# Patient Record
Sex: MALE | Race: OTHER | Employment: FULL TIME | URBAN - METROPOLITAN AREA
[De-identification: names, ages, dates, MRNs, and addresses within clinical notes are randomized per-mention and may not be internally consistent; named-entity substitution may affect disease eponyms.]

---

## 2017-01-27 PROCEDURE — 84153 ASSAY OF PSA TOTAL: CPT | Performed by: INTERNAL MEDICINE

## 2017-04-12 ENCOUNTER — HOSPITAL ENCOUNTER (OUTPATIENT)
Dept: RESPIRATORY THERAPY | Facility: HOSPITAL | Age: 47
Discharge: HOME | End: 2017-04-12
Attending: INTERNAL MEDICINE
Payer: COMMERCIAL

## 2017-04-12 PROCEDURE — 36600 WITHDRAWAL OF ARTERIAL BLOOD: CPT | Performed by: INTERNAL MEDICINE

## 2017-04-12 PROCEDURE — 82805 BLOOD GASES W/O2 SATURATION: CPT | Performed by: INTERNAL MEDICINE

## 2017-04-12 PROCEDURE — 80048 BASIC METABOLIC PNL TOTAL CA: CPT | Performed by: INTERNAL MEDICINE

## 2017-04-12 PROCEDURE — 85025 COMPLETE CBC W/AUTO DIFF WBC: CPT | Performed by: INTERNAL MEDICINE

## 2017-04-12 PROCEDURE — 82550 ASSAY OF CK (CPK): CPT | Performed by: INTERNAL MEDICINE

## 2017-05-19 PROCEDURE — 85378 FIBRIN DEGRADE SEMIQUANT: CPT | Performed by: INTERNAL MEDICINE

## 2017-05-19 PROCEDURE — 36415 COLL VENOUS BLD VENIPUNCTURE: CPT | Performed by: INTERNAL MEDICINE

## 2017-07-24 ENCOUNTER — HOSPITAL ENCOUNTER (INPATIENT)
Facility: HOSPITAL | Age: 47
LOS: 2 days | Discharge: HOME | DRG: 176 | End: 2017-07-26
Attending: HOSPITALIST | Admitting: HOSPITALIST
Payer: COMMERCIAL

## 2017-07-24 PROBLEM — I26.99 BILATERAL PULMONARY EMBOLISM (HCC): Status: ACTIVE | Noted: 2017-07-24

## 2017-07-24 RX ORDER — ALBUTEROL SULFATE 90 UG/1
2 AEROSOL, METERED RESPIRATORY (INHALATION) EVERY 6 HOURS PRN
Status: DISCONTINUED | OUTPATIENT
Start: 2017-07-24 | End: 2017-07-26

## 2017-07-24 RX ORDER — ACETAMINOPHEN 325 MG/1
650 TABLET ORAL EVERY 6 HOURS PRN
Status: DISCONTINUED | OUTPATIENT
Start: 2017-07-24 | End: 2017-07-26

## 2017-07-24 RX ORDER — ENOXAPARIN SODIUM 100 MG/ML
100 INJECTION SUBCUTANEOUS EVERY 12 HOURS SCHEDULED
Status: DISCONTINUED | OUTPATIENT
Start: 2017-07-25 | End: 2017-07-26

## 2017-07-24 RX ORDER — POLYETHYLENE GLYCOL 3350 17 G/17G
17 POWDER, FOR SOLUTION ORAL DAILY PRN
Status: DISCONTINUED | OUTPATIENT
Start: 2017-07-24 | End: 2017-07-26

## 2017-07-24 RX ORDER — BISACODYL 10 MG
10 SUPPOSITORY, RECTAL RECTAL
Status: DISCONTINUED | OUTPATIENT
Start: 2017-07-24 | End: 2017-07-26

## 2017-07-24 RX ORDER — DOCUSATE SODIUM 100 MG/1
100 CAPSULE, LIQUID FILLED ORAL 2 TIMES DAILY
Status: DISCONTINUED | OUTPATIENT
Start: 2017-07-24 | End: 2017-07-26

## 2017-07-24 RX ORDER — SODIUM PHOSPHATE, DIBASIC AND SODIUM PHOSPHATE, MONOBASIC 7; 19 G/133ML; G/133ML
1 ENEMA RECTAL ONCE AS NEEDED
Status: DISCONTINUED | OUTPATIENT
Start: 2017-07-24 | End: 2017-07-26

## 2017-07-24 RX ORDER — SODIUM CHLORIDE 0.9 % (FLUSH) 0.9 %
3 SYRINGE (ML) INJECTION AS NEEDED
Status: DISCONTINUED | OUTPATIENT
Start: 2017-07-24 | End: 2017-07-26

## 2017-07-24 RX ORDER — ONDANSETRON 2 MG/ML
4 INJECTION INTRAMUSCULAR; INTRAVENOUS EVERY 6 HOURS PRN
Status: DISCONTINUED | OUTPATIENT
Start: 2017-07-24 | End: 2017-07-26

## 2017-07-25 ENCOUNTER — APPOINTMENT (OUTPATIENT)
Dept: ULTRASOUND IMAGING | Facility: HOSPITAL | Age: 47
DRG: 176 | End: 2017-07-25
Attending: HOSPITALIST
Payer: COMMERCIAL

## 2017-07-25 ENCOUNTER — APPOINTMENT (OUTPATIENT)
Dept: CV DIAGNOSTICS | Facility: HOSPITAL | Age: 47
DRG: 176 | End: 2017-07-25
Attending: NURSE PRACTITIONER
Payer: COMMERCIAL

## 2017-07-25 PROBLEM — I82.432 ACUTE DEEP VEIN THROMBOSIS (DVT) OF POPLITEAL VEIN OF LEFT LOWER EXTREMITY (HCC): Status: ACTIVE | Noted: 2017-07-25

## 2017-07-25 PROBLEM — R91.1 LUNG NODULE: Status: ACTIVE | Noted: 2017-07-25

## 2017-07-25 PROCEDURE — 93306 TTE W/DOPPLER COMPLETE: CPT | Performed by: NURSE PRACTITIONER

## 2017-07-25 PROCEDURE — 93970 EXTREMITY STUDY: CPT | Performed by: HOSPITALIST

## 2017-07-25 RX ORDER — ZOLPIDEM TARTRATE 5 MG/1
5 TABLET ORAL ONCE
Status: COMPLETED | OUTPATIENT
Start: 2017-07-25 | End: 2017-07-25

## 2017-07-25 NOTE — CONSULTS
Pulmonary H&P/Consult     NAME: Lam Fitzgerald: [id number] - MRN: [id number] - Age: 56year old - :  1970    Date of Admission: 2017 10:14 PM  Admission Diagnosis: bilateral pulmonary embolism  Bilateral pulmonary embolism (HCC)    Lauren reviewed. No pertinent surgical history.   Family History   Problem Relation Age of Onset   • Heart Disorder Father      MI   • Glaucoma Father    • Cancer Mother      breast   • Stroke Sister    • Lung Disorder Neg       Smoking status: Former Smoker  1.50 independently visualized all relevant chest imaging in PACS, agree with radiology interpretation except where noted.

## 2017-07-25 NOTE — H&P
[facility] Hospitalist Team  History and Physical     ASSESSMENT / PLAN:   57 yo male with previous tobacco use, ? Asthma/RAD, occular HTN and  Intermittent SOB who was found to have B/L PE and lung nodule done on outpt testing. Pt admitted for evaluation and tx. not get this done as he wanted to [facility] it\" despite being informed of risks. 2 weeks ago he felt like his breathing was back to normal however again found just recently that he could not do his usual exercises without feeling SOB.  He denies any CP or Pa for the 7/24/17 encounter [facility] Encounter).     Soc Hx     Smoking status: Former Smoker  1.50 Packs/day  For 20.00 Years     Start date: 4/12/1986    Quit date: 2/28/2009    Smokeless tobacco: Never Used    Comment: 1-2 packs    Alcohol use Yes  0.0 oz ([id number])    Result Date: 7/25/2017  CONCLUSION:  1. Acute appearing nonocclusive thrombus in the left popliteal vein.  2. No evidence of occlusive or nonocclusive thrombosis in the visualized portions of the deep venous systems in the right lower extremi

## 2017-07-26 VITALS
BODY MASS INDEX: 33 KG/M2 | TEMPERATURE: 98 F | WEIGHT: 228.56 LBS | SYSTOLIC BLOOD PRESSURE: 124 MMHG | HEART RATE: 57 BPM | DIASTOLIC BLOOD PRESSURE: 79 MMHG | RESPIRATION RATE: 18 BRPM | OXYGEN SATURATION: 97 %

## 2017-07-26 LAB
ANION GAP SERPL CALC-SCNC: 8 MMOL/L (ref 0–18)
BASOPHILS # BLD: 0.1 K/UL (ref 0–0.2)
BASOPHILS NFR BLD: 1 %
BUN SERPL-MCNC: 14 MG/DL (ref 8–20)
BUN/CREAT SERPL: 12.7 (ref 10–20)
CALCIUM SERPL-MCNC: 9.6 MG/DL (ref 8.5–10.5)
CHLORIDE SERPL-SCNC: 106 MMOL/L (ref 95–110)
CO2 SERPL-SCNC: 24 MMOL/L (ref 22–32)
CREAT SERPL-MCNC: 1.1 MG/DL (ref 0.5–1.5)
EOSINOPHIL # BLD: 0.4 K/UL (ref 0–0.7)
EOSINOPHIL NFR BLD: 5 %
ERYTHROCYTE [DISTWIDTH] IN BLOOD BY AUTOMATED COUNT: 14.4 % (ref 11–15)
GLUCOSE SERPL-MCNC: 120 MG/DL (ref 70–99)
HCT VFR BLD AUTO: 49.4 % (ref 41–52)
HGB BLD-MCNC: 16.6 G/DL (ref 13.5–17.5)
LYMPHOCYTES # BLD: 2.9 K/UL (ref 1–4)
LYMPHOCYTES NFR BLD: 37 %
MCH RBC QN AUTO: 28.9 PG (ref 27–32)
MCHC RBC AUTO-ENTMCNC: 33.5 G/DL (ref 32–37)
MCV RBC AUTO: 86.4 FL (ref 80–100)
MONOCYTES # BLD: 0.7 K/UL (ref 0–1)
MONOCYTES NFR BLD: 9 %
NEUTROPHILS # BLD AUTO: 3.8 K/UL (ref 1.8–7.7)
NEUTROPHILS NFR BLD: 48 %
OSMOLALITY UR CALC.SUM OF ELEC: 288 MOSM/KG (ref 275–295)
PLATELET # BLD AUTO: 292 K/UL (ref 140–400)
PMV BLD AUTO: 7.3 FL (ref 7.4–10.3)
POTASSIUM SERPL-SCNC: 4 MMOL/L (ref 3.3–5.1)
RBC # BLD AUTO: 5.72 M/UL (ref 4.5–5.9)
SODIUM SERPL-SCNC: 138 MMOL/L (ref 136–144)
WBC # BLD AUTO: 7.8 K/UL (ref 4–11)

## 2017-07-26 PROCEDURE — 85025 COMPLETE CBC W/AUTO DIFF WBC: CPT | Performed by: NURSE PRACTITIONER

## 2017-07-26 PROCEDURE — 80048 BASIC METABOLIC PNL TOTAL CA: CPT | Performed by: NURSE PRACTITIONER

## 2017-07-26 NOTE — PROGRESS NOTES
Pulmonary Progress Note     Assessment / Plan:  1. DVT/PE, acute - symptoms ongoing since 5/17, progressive. No provocating factors. Trop normal, but Cr elevated.  TTE with mildly dilated RV with PASP 40. LE duplex with nonocclusive left popliteal DVT  - co

## 2017-07-26 NOTE — DISCHARGE SUMMARY
[facility] Hospitalist Discharge Summary   Patient ID:  Karishma Isaacs  [id number]  28 year old  2/11/1970    Admit date: 7/24/2017  Discharge date: 7/26/2017  Risk of Readmission Lace+ Score: 11  59-90 High Risk  29-58 Medium Risk  0-28   Low Risk    Primary Ca details      B/L Pulmonary Embolus/Left Leg Popliteal DVT  -CT chest- B/L PE  -US legs-acute nonocclusive clot left popliteal DVT  -Echo-Ef 55-60% no WMA, nl RV size, PAP 40  -xarelto 15 mg BID x 21 days then 20 mg daily  -oupt hypercoag work up with Ghada Tee the visualized portions of the deep venous systems in the right lower extremity.           Discharge Instructions     Medication List      START taking these medications    Rivaroxaban 15 & 20 MG Tbpk  Commonly known as:  XARELTO STARTER PACK  Take 1 tablet partner in [facility] about possible clotting disorder to cause blood clot    Follow up with eye doctor about resuming eye drops     Total Time Coordinating Care: greater than 30 minutes    Patient had opportunity to ask questions and state understand and agree w

## 2017-07-26 NOTE — PLAN OF CARE
Patient Centered Care    • Patient preferences are identified and integrated in the patient's plan of care Completed        Patient/Family Goals    • Patient/Family Long Term Goal Completed    • Patient/Family Short Term Goal Completed        RESPIRATORY -

## 2017-07-27 NOTE — PAYOR COMM NOTE
REF#[id number]    Deana Torres #[id number]   Admission Info: Inpatient (Adm: 07/24/17)   Hospital Account: [de-identified]   Description: 56year old M Primary Service: Cardiac Telemetry Unit Info: [facility] 3-W/SW   History and Physical     H&P signed by Leslie Lake, 57 yo male with previous tobacco use, ? Asthma/RAD and  Intermittent SOB who was found to have B/L PE and lung nodule done on outpt testing. Pt admitted for evaluation and tx.  Pt found to have left popliteal DVT, started on lovenox and pulmonary consulted ABDOMEN:  Soft, BS+; nondistended; non tender;no masses;    EXTREMITIES: no edema; no cyanosis;, no calf tenderness; peripheral pulses intact      PMH       Past Medical History:   Diagnosis Date   • DVT, popliteal, acute ([facility] [address]     • Family history of glauc IMPRESSION:  1. Bilateral pulmonary emboli. Mild straightening of the intraventricular septum. 2.  Minimal groundglass airspace disease within the right upper lobe and left lower lobe likely reflective of an infectious/inflammatory pneumonitis.  3.  Right Pulm: Lungs clear, normal respiratory effort, No wheezing or crackles  CV: Heart with regular rate and rhythm, No murmurs, rubs, gallops  Abd: Abdomen soft, nontender, nondistended,   MSK:  no clubbing, no cyanosis.   No Lower extremity edema  Skin: no rash - recommend hypercoag evaluation as outpatient with hematology  2. Pulmonary nodule - 4mm, former smoker  - repeat imaging in 12 months  3. Asthma - less likely diagnosis in the setting of VTE  - cont albuterol prn  - d/c breo   4.  Dispo - full code  - leesa   Comment: 1-2 packs                 Alcohol use Yes   0.0 oz/week        Comment: OCCASIONAL      Medications:  • docusate sodium  100 mg Oral BID   • enoxaparin  100 mg Subcutaneous 2 times per day         ALLERGIES:   Sulfa Antibiotics Progress Notes signed by Chelsea Castellanos MD at 7/26/2017  9:55 AM     Author: Chelsea Castellanos MD Service: Pulmonology Author Type: Physician   Filed: 7/26/2017  9:55 AM Date of Service: 7/26/2017  9:51 AM Status: Signed   : Chelsea Castellanos MD ( Discharge Summary Notes     Discharge Summaries signed by La Nena Bautista MD at 7/26/2017  2:02 PM     Author: La Nena Bautista MD Service: Hospitalist Author Type: Physician   Filed: 7/26/2017  2:02 PM Date of Service: 7/26/2017  7:22 AM Status: Addendum   : [facility] Hospital Course:     55 yo male with previous tobacco use, ? Asthma/RAD, occular HTN and  Intermittent SOB who was found to have B/L PE and lung nodule done on outpt testing. Pt admitted for evaluation and tx.  Pt found to have left popliteal DVT, started o   Hui Lamb MD. Schedule an appointment as soon as possible for a visit in 1 week.     Specialties:  Internal Medicine, PEDIATRICS  Why:  hospital follow up  Contact information:  [address]

## 2017-11-27 PROCEDURE — 86147 CARDIOLIPIN ANTIBODY EA IG: CPT | Performed by: INTERNAL MEDICINE

## 2017-11-27 PROCEDURE — 85307 ASSAY ACTIVATED PROTEIN C: CPT | Performed by: INTERNAL MEDICINE

## 2017-11-27 PROCEDURE — 85306 CLOT INHIBIT PROT S FREE: CPT | Performed by: INTERNAL MEDICINE

## 2017-11-27 PROCEDURE — 85732 THROMBOPLASTIN TIME PARTIAL: CPT | Performed by: INTERNAL MEDICINE

## 2017-11-27 PROCEDURE — 81241 F5 GENE: CPT | Performed by: INTERNAL MEDICINE

## 2017-11-27 PROCEDURE — 85705 THROMBOPLASTIN INHIBITION: CPT | Performed by: INTERNAL MEDICINE

## 2017-11-27 PROCEDURE — 85610 PROTHROMBIN TIME: CPT | Performed by: INTERNAL MEDICINE

## 2017-11-27 PROCEDURE — 85305 CLOT INHIBIT PROT S TOTAL: CPT | Performed by: INTERNAL MEDICINE

## 2017-11-27 PROCEDURE — 86146 BETA-2 GLYCOPROTEIN ANTIBODY: CPT | Performed by: INTERNAL MEDICINE

## 2017-11-27 PROCEDURE — 85613 RUSSELL VIPER VENOM DILUTED: CPT | Performed by: INTERNAL MEDICINE

## 2017-11-27 PROCEDURE — 81240 F2 GENE: CPT | Performed by: INTERNAL MEDICINE

## 2017-11-27 PROCEDURE — 85301 ANTITHROMBIN III ANTIGEN: CPT | Performed by: INTERNAL MEDICINE

## 2017-11-27 PROCEDURE — 85303 CLOT INHIBIT PROT C ACTIVITY: CPT | Performed by: INTERNAL MEDICINE

## 2018-12-01 ENCOUNTER — OFFICE VISIT (OUTPATIENT)
Dept: INTERNAL MEDICINE CLINIC | Facility: CLINIC | Age: 48
End: 2018-12-01
Payer: COMMERCIAL

## 2018-12-01 VITALS
DIASTOLIC BLOOD PRESSURE: 88 MMHG | HEIGHT: 70 IN | HEART RATE: 64 BPM | WEIGHT: 226 LBS | SYSTOLIC BLOOD PRESSURE: 138 MMHG | BODY MASS INDEX: 32.35 KG/M2 | TEMPERATURE: 98 F

## 2018-12-01 DIAGNOSIS — Z76.89 ENCOUNTER TO ESTABLISH CARE: Primary | ICD-10-CM

## 2018-12-01 DIAGNOSIS — R73.09 ELEVATED GLUCOSE LEVEL: ICD-10-CM

## 2018-12-01 DIAGNOSIS — G47.00 INSOMNIA, UNSPECIFIED TYPE: ICD-10-CM

## 2018-12-01 PROCEDURE — 90686 IIV4 VACC NO PRSV 0.5 ML IM: CPT | Performed by: INTERNAL MEDICINE

## 2018-12-01 PROCEDURE — 90471 IMMUNIZATION ADMIN: CPT | Performed by: INTERNAL MEDICINE

## 2018-12-01 PROCEDURE — 99204 OFFICE O/P NEW MOD 45 MIN: CPT | Performed by: INTERNAL MEDICINE

## 2018-12-01 RX ORDER — TRAZODONE HYDROCHLORIDE 50 MG/1
25 TABLET ORAL NIGHTLY
Qty: 30 TABLET | Refills: 1 | Status: SHIPPED | OUTPATIENT
Start: 2018-12-01 | End: 2019-02-12

## 2018-12-01 NOTE — PROGRESS NOTES
HPI:    Patient ID: Evans Gutiérrez is a 53year old male.     HPI  Patient comes in for first time to establish care he is complaining today that he is unable to lose weight even though he exercises regularly and he watches his diet pretty well last year h Take 1 tablet (600 mg total) by mouth every 6 (six) hours as needed for Pain.  Disp: 40 tablet Rfl: 1     Allergies:  Sulfa Antibiotics           HISTORY:  Past Medical History:   Diagnosis Date   • DVT, popliteal, acute ([facility] [address]    • Essential hypertension no edema or tenderness. Neurological: He is alert and oriented to person, place, and time. He has normal reflexes. Skin: Skin is warm and dry. Psychiatric: He has a normal mood and affect.  His behavior is normal. Judgment and thought content normal.

## 2018-12-08 ENCOUNTER — APPOINTMENT (OUTPATIENT)
Dept: LAB | Age: 48
End: 2018-12-08
Attending: INTERNAL MEDICINE
Payer: COMMERCIAL

## 2018-12-08 DIAGNOSIS — R73.09 ELEVATED GLUCOSE LEVEL: ICD-10-CM

## 2018-12-08 DIAGNOSIS — Z76.89 ENCOUNTER TO ESTABLISH CARE: ICD-10-CM

## 2018-12-08 DIAGNOSIS — G47.00 INSOMNIA, UNSPECIFIED TYPE: ICD-10-CM

## 2018-12-08 PROCEDURE — 83036 HEMOGLOBIN GLYCOSYLATED A1C: CPT

## 2018-12-08 PROCEDURE — 36415 COLL VENOUS BLD VENIPUNCTURE: CPT

## 2018-12-08 PROCEDURE — 84403 ASSAY OF TOTAL TESTOSTERONE: CPT

## 2018-12-15 ENCOUNTER — PATIENT MESSAGE (OUTPATIENT)
Dept: INTERNAL MEDICINE CLINIC | Facility: CLINIC | Age: 48
End: 2018-12-15

## 2018-12-15 DIAGNOSIS — R53.83 OTHER FATIGUE: Primary | ICD-10-CM

## 2018-12-17 ENCOUNTER — TELEPHONE (OUTPATIENT)
Dept: INTERNAL MEDICINE CLINIC | Facility: CLINIC | Age: 48
End: 2018-12-17

## 2018-12-17 NOTE — TELEPHONE ENCOUNTER
How come the free testosterone was not done just total, usually they are ordered together or am I not seeing the right lab? ?

## 2018-12-17 NOTE — TELEPHONE ENCOUNTER
Left message on voicemail that patient needs to have testosterone level redrawn at lab. Sample needs to be frozen and we didn't have capability to freeze here in office. He has labs drawn at [facility]. Needs his [facility] orders. They are in system.

## 2018-12-17 NOTE — TELEPHONE ENCOUNTER
From: Yariel Villafana  To: Nithya Christina MD  Sent: 12/15/2018 7:54 AM CST  Subject: Test Results Question    Dr. Stephen Solano,    So looking at my testosterone levels I know that it is within normal range but is there anything we can do to raise it?  I am compet

## 2018-12-17 NOTE — TELEPHONE ENCOUNTER
Patient needs to have blood work for testosterone drawn at [facility]. Order placed. Left message for patient to call back.

## 2019-01-26 ENCOUNTER — APPOINTMENT (OUTPATIENT)
Dept: LAB | Age: 49
End: 2019-01-26
Attending: INTERNAL MEDICINE
Payer: COMMERCIAL

## 2019-01-26 PROCEDURE — 36415 COLL VENOUS BLD VENIPUNCTURE: CPT | Performed by: INTERNAL MEDICINE

## 2019-01-26 PROCEDURE — 84402 ASSAY OF FREE TESTOSTERONE: CPT | Performed by: INTERNAL MEDICINE

## 2019-01-26 PROCEDURE — 84403 ASSAY OF TOTAL TESTOSTERONE: CPT | Performed by: INTERNAL MEDICINE

## 2019-02-01 ENCOUNTER — TELEPHONE (OUTPATIENT)
Dept: INTERNAL MEDICINE CLINIC | Facility: CLINIC | Age: 49
End: 2019-02-01

## 2019-02-01 ENCOUNTER — MED REC SCAN ONLY (OUTPATIENT)
Dept: INTERNAL MEDICINE CLINIC | Facility: CLINIC | Age: 49
End: 2019-02-01

## 2019-02-01 DIAGNOSIS — R79.89 LOW TESTOSTERONE: Primary | ICD-10-CM

## 2019-03-11 ENCOUNTER — OFFICE VISIT (OUTPATIENT)
Dept: SURGERY | Facility: CLINIC | Age: 49
End: 2019-03-11
Payer: COMMERCIAL

## 2019-03-11 ENCOUNTER — APPOINTMENT (OUTPATIENT)
Dept: LAB | Facility: HOSPITAL | Age: 49
End: 2019-03-11
Attending: INTERNAL MEDICINE
Payer: COMMERCIAL

## 2019-03-11 VITALS
OXYGEN SATURATION: 100 % | BODY MASS INDEX: 32.79 KG/M2 | RESPIRATION RATE: 18 BRPM | HEART RATE: 101 BPM | HEIGHT: 70 IN | WEIGHT: 229.06 LBS

## 2019-03-11 DIAGNOSIS — I10 ESSENTIAL HYPERTENSION: ICD-10-CM

## 2019-03-11 DIAGNOSIS — I10 ESSENTIAL HYPERTENSION: Primary | ICD-10-CM

## 2019-03-11 DIAGNOSIS — R63.5 WEIGHT GAIN: ICD-10-CM

## 2019-03-11 DIAGNOSIS — R63.2 BINGE EATING: ICD-10-CM

## 2019-03-11 DIAGNOSIS — E66.9 OBESITY (BMI 30-39.9): ICD-10-CM

## 2019-03-11 DIAGNOSIS — R73.03 PRE-DIABETES: ICD-10-CM

## 2019-03-11 DIAGNOSIS — Z51.81 ENCOUNTER FOR THERAPEUTIC DRUG MONITORING: ICD-10-CM

## 2019-03-11 PROCEDURE — 99204 OFFICE O/P NEW MOD 45 MIN: CPT | Performed by: INTERNAL MEDICINE

## 2019-03-11 PROCEDURE — 93010 ELECTROCARDIOGRAM REPORT: CPT | Performed by: INTERNAL MEDICINE

## 2019-03-11 PROCEDURE — 93005 ELECTROCARDIOGRAM TRACING: CPT

## 2019-03-11 NOTE — PROGRESS NOTES
[facility] Consultation Note       Date of Consult:  3/11/2019    Patient:  Len Domínguez  :      1970  MRN:      [id number]    Referring Provider: Dr. Brian Dick       Chief Complaint:  Patient presents with:  Consult: non surgica file    Occupational History      Occupation:     Social Needs      Financial resource strain: Not on file      Food insecurity:        Worry: Not on file        Inability: Not on file      Transportation needs:        Medical: Not on file burger, steak, veggies, sweet potatoes,  Fruit, PB, sweets,  Chicken, steak, veggie, starch   +sweet tooth      Soda Drinker?: No  If yes, how much?:      Number of restaurant or fast food meals/week:   meals/week    Nutritional Goals Reviewed and Discusse Diagnosis(ses):   Essential hypertension  (primary encounter diagnosis)  Pre-diabetes  Encounter for therapeutic drug monitoring  Weight gain  Obesity (BMI 30-39. 9)    PLAN     Patient is not interested in bariatric surgery.  Patient desires to pursue tradi

## 2019-03-14 ENCOUNTER — TELEPHONE (OUTPATIENT)
Dept: SURGERY | Facility: CLINIC | Age: 49
End: 2019-03-14

## 2019-03-14 NOTE — TELEPHONE ENCOUNTER
Per the request of Dr. Chelo Sanders patient and left voicemail letting him know that Body Composition Test is not covered under his insurance. I did give him the cost and gave him dept phone number to call and schedule with Dietitian.

## 2019-03-14 NOTE — TELEPHONE ENCOUNTER
3/13/19 @ 3:11pm Spoke to Gilmer Gavin at [facility], [phone], ALL#[id number]. She verified that patient has following benefits for Bariatric services:   • EHV (OWK#[id number]) DX E66.9 & E66.3   - Dietitian ([id number]) – Yes. No limit,  no prior auth req’d.

## 2019-04-18 ENCOUNTER — OFFICE VISIT (OUTPATIENT)
Dept: SURGERY | Facility: CLINIC | Age: 49
End: 2019-04-18
Payer: COMMERCIAL

## 2019-04-18 VITALS
BODY MASS INDEX: 32.5 KG/M2 | WEIGHT: 227 LBS | SYSTOLIC BLOOD PRESSURE: 138 MMHG | OXYGEN SATURATION: 95 % | HEIGHT: 70 IN | HEART RATE: 59 BPM | DIASTOLIC BLOOD PRESSURE: 90 MMHG | RESPIRATION RATE: 16 BRPM

## 2019-04-18 DIAGNOSIS — G47.00 INSOMNIA, UNSPECIFIED TYPE: Primary | ICD-10-CM

## 2019-04-18 DIAGNOSIS — E66.9 OBESITY (BMI 30-39.9): ICD-10-CM

## 2019-04-18 DIAGNOSIS — Z51.81 ENCOUNTER FOR THERAPEUTIC DRUG MONITORING: ICD-10-CM

## 2019-04-18 DIAGNOSIS — R73.03 PRE-DIABETES: ICD-10-CM

## 2019-04-18 PROCEDURE — 99214 OFFICE O/P EST MOD 30 MIN: CPT | Performed by: INTERNAL MEDICINE

## 2019-04-18 RX ORDER — TRAZODONE HYDROCHLORIDE 50 MG/1
50 TABLET ORAL NIGHTLY
Qty: 30 TABLET | Refills: 2 | Status: SHIPPED | OUTPATIENT
Start: 2019-04-18 | End: 2021-11-23

## 2019-04-18 RX ORDER — IBUPROFEN 600 MG/1
TABLET ORAL
Refills: 0 | COMMUNITY
Start: 2019-04-08 | End: 2021-11-23

## 2019-04-18 NOTE — PROGRESS NOTES
[address] [place] [address] Candice Vincentdomenico  [facility] [address] [facility]  Dept: [phone]       Patient:  Gracy Junior  :      1970  MRN:      [id number]    Chief Complaint:  Patient presents insecurity:        Worry: Not on file        Inability: Not on file      Transportation needs:        Medical: Not on file        Non-medical: Not on file    Tobacco Use      Smoking status: Former Smoker        Packs/day: 1.50        Years: 20.00        P weights at gym  · Shahnaz ellsworth  · Has rigorous work out routine     Eating Habits  · Patient states the following:  · Eats 3 meal(s) per day  · Length of time it takes to consume a meal:  20  · # of snacks per day: 1 Type of snacks:   Yogurt, fruit  · Amount Encounter Diagnosis(ses): Insomnia, unspecified type  (primary encounter diagnosis)  Pre-diabetes  Encounter for therapeutic drug monitoring  Obesity (bmi 30-39. 9)    PLAN     Patient is not interested in bariatric surgery.  Patient desires to pursu

## 2019-05-03 ENCOUNTER — OFFICE VISIT (OUTPATIENT)
Dept: FAMILY MEDICINE CLINIC | Facility: CLINIC | Age: 49
End: 2019-05-03
Payer: COMMERCIAL

## 2019-05-03 ENCOUNTER — APPOINTMENT (OUTPATIENT)
Dept: LAB | Age: 49
End: 2019-05-03
Attending: FAMILY MEDICINE
Payer: COMMERCIAL

## 2019-05-03 VITALS
BODY MASS INDEX: 32.53 KG/M2 | HEART RATE: 62 BPM | SYSTOLIC BLOOD PRESSURE: 148 MMHG | DIASTOLIC BLOOD PRESSURE: 72 MMHG | HEIGHT: 70 IN | WEIGHT: 227.25 LBS

## 2019-05-03 DIAGNOSIS — R73.9 HYPERGLYCEMIA: ICD-10-CM

## 2019-05-03 DIAGNOSIS — R73.9 HYPERGLYCEMIA: Primary | ICD-10-CM

## 2019-05-03 DIAGNOSIS — Z00.00 ROUTINE PHYSICAL EXAMINATION: ICD-10-CM

## 2019-05-03 DIAGNOSIS — D17.1 LIPOMA OF TORSO: ICD-10-CM

## 2019-05-03 PROCEDURE — 36415 COLL VENOUS BLD VENIPUNCTURE: CPT

## 2019-05-03 PROCEDURE — 84460 ALANINE AMINO (ALT) (SGPT): CPT

## 2019-05-03 PROCEDURE — 80061 LIPID PANEL: CPT

## 2019-05-03 PROCEDURE — 84443 ASSAY THYROID STIM HORMONE: CPT

## 2019-05-03 PROCEDURE — 84450 TRANSFERASE (AST) (SGOT): CPT

## 2019-05-03 PROCEDURE — 80048 BASIC METABOLIC PNL TOTAL CA: CPT

## 2019-05-03 PROCEDURE — 99396 PREV VISIT EST AGE 40-64: CPT | Performed by: FAMILY MEDICINE

## 2019-05-03 NOTE — PROGRESS NOTES
Blood pressure 148/72, pulse 62, height 5' 10\" (1.778 m), weight 227 lb 4 oz (103.1 kg). REASON FOR VISIT:    Karishma Isaacs is a 51year old male who presents for an [address].         Patient Active Problem List:     OH (ocular hyperte (H)   03/01/2014 102 (H)        Gonorrhea Screening If high risk No results found for: GONOCOCCUS   HIV Screening For all adults age 22-65, older adults at increased risk No results found for: HIV   Syphilis Screening Screen if pregnant or high risk No res 0.0 oz      Comment: OCCASIONAL    Drug use: Yes      Frequency: 2.0 times per week      Types: Cannabis    Occ:  :       REVIEW OF SYSTEMS:   GENERAL: feels well otherwise  SKIN: denies any unusual skin lesions  EYES: denies blurred vision or doubl Future  -     LIPID PANEL; Future  -     TSH W REFLEX TO FREE T4; Future  -     CT CALCIUM SCORING; Future    Lipoma of torso  -     SURGERY - INTERNAL       The patient indicates understanding of these issues and agrees to the plan.   Return in about 3 wee

## 2019-05-03 NOTE — PATIENT INSTRUCTIONS
Insomnia  Insomnia is repeated difficulty going to sleep or staying asleep, or both. Whether you have insomnia is not defined by a specific amount of sleep.  Different people need different amounts of sleep, and you may need more or less sleep at [place] [address] Many different medidcines can affect your sleep, such as stimulants, caffeine, alcohol, some decongestants, and diet pills.  Other medicines may include some types of blood pressure pills, steroids, asthma medicines, antihistamines, antidepressants, seizure · Get regular exercise. Find other ways to lessen your stress level. · If a medicine was prescribed to help reset your sleep patterns, take it as directed. Sleeping pills are intended for short-term use, only.  If taken for too long, the effect wears off w Good sleeping habits are a key part of treatment. If needed, some medicines may help you sleep better at first. Making healthy lifestyle changes and learning to relax can improve your sleep. Treating insomnia takes commitment.  But trust that your efforts w Stress, anxiety, and body tension may keep you awake at night. To unwind before bedtime, try a warm bath, meditation, or yoga. Also try the following:  · Deep breathing. Sit or lie back in a chair. Take a slow, deep breath. Hold it for 5 counts.  Then breat

## 2020-11-16 ENCOUNTER — HOSPITAL ENCOUNTER (OUTPATIENT)
Age: 50
Discharge: HOME | End: 2020-11-16
Attending: EMERGENCY MEDICINE
Payer: COMMERCIAL

## 2020-11-16 VITALS
HEIGHT: 71 IN | OXYGEN SATURATION: 97 % | SYSTOLIC BLOOD PRESSURE: 136 MMHG | BODY MASS INDEX: 32.2 KG/M2 | HEART RATE: 64 BPM | DIASTOLIC BLOOD PRESSURE: 94 MMHG | TEMPERATURE: 98 F | WEIGHT: 230 LBS | RESPIRATION RATE: 20 BRPM

## 2020-11-16 DIAGNOSIS — Z20.822 COVID-19 VIRUS TEST RESULT UNKNOWN: Primary | ICD-10-CM

## 2020-11-16 PROCEDURE — 99203 OFFICE O/P NEW LOW 30 MIN: CPT

## 2020-11-16 PROCEDURE — 99213 OFFICE O/P EST LOW 20 MIN: CPT

## 2020-11-16 NOTE — ED INITIAL ASSESSMENT (HPI)
PATIENT WITH INTERMITTENT SCRATCHY THROAT. WIFE AND DAUGHTER TESTED FOR COVID OVER THE WEEKEND. WAITING FOR TEST RESULTS.

## 2020-11-16 NOTE — ED PROVIDER NOTES
Patient Seen in: [facility]      History   Patient presents with:  Testing    Stated Complaint: Covid 19 test    HPI    Patient is a 52-year-old male who requests Covid test today.   He states he has had a scratchy throat and maybe a slight co benton          ED Course     Labs Reviewed   SARS-COV-2 RNA,QUAL RT-PCR ([facility])         MDM     COVID-19 counseling performed.  -Discussed the need for immediate isolation, even before results of the test are available.   -Advised patient to inform their i

## 2021-06-21 ENCOUNTER — HOSPITAL ENCOUNTER (OUTPATIENT)
Age: 51
Discharge: HOME | End: 2021-06-21
Payer: COMMERCIAL

## 2021-06-21 VITALS
HEART RATE: 67 BPM | SYSTOLIC BLOOD PRESSURE: 139 MMHG | TEMPERATURE: 98 F | DIASTOLIC BLOOD PRESSURE: 93 MMHG | RESPIRATION RATE: 18 BRPM | OXYGEN SATURATION: 100 %

## 2021-06-21 DIAGNOSIS — B35.9 TINEA: Primary | ICD-10-CM

## 2021-06-21 PROCEDURE — 99213 OFFICE O/P EST LOW 20 MIN: CPT

## 2021-06-21 RX ORDER — LORAZEPAM 0.5 MG/1
0.5 TABLET ORAL NIGHTLY PRN
COMMUNITY
Start: 2021-03-27 | End: 2021-11-23

## 2021-06-21 RX ORDER — LISINOPRIL 20 MG/1
20 TABLET ORAL DAILY
COMMUNITY
Start: 2020-10-21

## 2021-06-21 NOTE — ED PROVIDER NOTES
Patient Seen in: [facility]      History   Patient presents with:  Rash Skin Problem    Stated Complaint: Possible ringworm    HPI/Subjective:   Ochoa Paredes is a 49year old male here for possible ringworm.   There is an outbreak at his gy nursing note reviewed. Constitutional:       Appearance: Normal appearance. He is not ill-appearing. HENT:      Head: Normocephalic. Eyes:      Conjunctiva/sclera: Conjunctivae normal.      Pupils: Pupils are equal, round, and reactive to light.    Pu diagnosis)     Disposition:  Discharge  6/21/2021  6:19 pm    Follow-up:  Lakeisha Smith MD  [address] [place] [address]  [phone]                Medications Prescribed:  Discharge Medication List as of 6/21/2021  6:20 PM    START taking th

## 2022-01-02 PROBLEM — F41.9 ANXIETY: Status: ACTIVE | Noted: 2022-01-02

## 2022-08-04 ENCOUNTER — LAB REQUISITION (OUTPATIENT)
Dept: LAB | Age: 52
End: 2022-08-04

## 2022-08-04 ENCOUNTER — LAB SERVICES (OUTPATIENT)
Dept: LAB | Age: 52
End: 2022-08-04

## 2022-08-04 DIAGNOSIS — R68.82 DECREASED LIBIDO: ICD-10-CM

## 2022-08-04 DIAGNOSIS — R73.9 HYPERGLYCEMIA, UNSPECIFIED: ICD-10-CM

## 2022-08-04 DIAGNOSIS — Z00.00 ENCOUNTER FOR GENERAL ADULT MEDICAL EXAMINATION WITHOUT ABNORMAL FINDINGS: ICD-10-CM

## 2022-08-04 DIAGNOSIS — Z12.5 ENCOUNTER FOR SCREENING FOR MALIGNANT NEOPLASM OF PROSTATE: ICD-10-CM

## 2022-08-04 LAB
ALBUMIN SERPL-MCNC: 4.1 G/DL (ref 3.6–5.1)
ALBUMIN/GLOB SERPL: 1.4 {RATIO} (ref 1–2.4)
ALP SERPL-CCNC: 64 UNITS/L (ref 45–117)
ALT SERPL-CCNC: 21 UNITS/L
ANION GAP SERPL CALC-SCNC: 13 MMOL/L (ref 7–19)
AST SERPL-CCNC: 11 UNITS/L
BASOPHILS # BLD: 0.1 K/MCL (ref 0–0.3)
BASOPHILS NFR BLD: 1 %
BILIRUB SERPL-MCNC: 0.5 MG/DL (ref 0.2–1)
BUN SERPL-MCNC: 23 MG/DL (ref 6–20)
BUN/CREAT SERPL: 20 (ref 7–25)
CALCIUM SERPL-MCNC: 9.9 MG/DL (ref 8.4–10.2)
CHLORIDE SERPL-SCNC: 109 MMOL/L (ref 97–110)
CHOLEST SERPL-MCNC: 189 MG/DL
CHOLEST/HDLC SERPL: 3.5 {RATIO}
CO2 SERPL-SCNC: 24 MMOL/L (ref 21–32)
CREAT SERPL-MCNC: 1.13 MG/DL (ref 0.67–1.17)
DEPRECATED RDW RBC: 41.5 FL (ref 39–50)
EOSINOPHIL # BLD: 0.2 K/MCL (ref 0–0.5)
EOSINOPHIL NFR BLD: 4 %
ERYTHROCYTE [DISTWIDTH] IN BLOOD: 12.6 % (ref 11–15)
FASTING DURATION TIME PATIENT: ABNORMAL H
FASTING DURATION TIME PATIENT: NORMAL H
GFR SERPLBLD BASED ON 1.73 SQ M-ARVRAT: 79 ML/MIN
GLOBULIN SER-MCNC: 2.9 G/DL (ref 2–4)
GLUCOSE SERPL-MCNC: 100 MG/DL (ref 70–99)
HBA1C MFR BLD: 5.6 % (ref 4.5–5.6)
HCT VFR BLD CALC: 43.8 % (ref 39–51)
HDLC SERPL-MCNC: 54 MG/DL
HGB BLD-MCNC: 15 G/DL (ref 13–17)
IMM GRANULOCYTES # BLD AUTO: 0 K/MCL (ref 0–0.2)
IMM GRANULOCYTES # BLD: 0 %
LDLC SERPL CALC-MCNC: 117 MG/DL
LYMPHOCYTES # BLD: 2.2 K/MCL (ref 1–4)
LYMPHOCYTES NFR BLD: 34 %
MCH RBC QN AUTO: 30.6 PG (ref 26–34)
MCHC RBC AUTO-ENTMCNC: 34.2 G/DL (ref 32–36.5)
MCV RBC AUTO: 89.4 FL (ref 78–100)
MONOCYTES # BLD: 0.6 K/MCL (ref 0.3–0.9)
MONOCYTES NFR BLD: 9 %
NEUTROPHILS # BLD: 3.4 K/MCL (ref 1.8–7.7)
NEUTROPHILS NFR BLD: 52 %
NONHDLC SERPL-MCNC: 135 MG/DL
NRBC BLD MANUAL-RTO: 0 /100 WBC
PLATELET # BLD AUTO: 329 K/MCL (ref 140–450)
POTASSIUM SERPL-SCNC: 4.8 MMOL/L (ref 3.4–5.1)
PROT SERPL-MCNC: 7 G/DL (ref 6.4–8.2)
PSA SERPL-MCNC: 3.35 NG/ML
RBC # BLD: 4.9 MIL/MCL (ref 4.5–5.9)
SODIUM SERPL-SCNC: 141 MMOL/L (ref 135–145)
TESTOST SERPL-MCNC: 318.5 NG/DL (ref 280–1100)
TRIGL SERPL-MCNC: 90 MG/DL
TSH SERPL-ACNC: 1.46 MCUNITS/ML (ref 0.35–5)
WBC # BLD: 6.6 K/MCL (ref 4.2–11)

## 2022-08-04 PROCEDURE — 84443 ASSAY THYROID STIM HORMONE: CPT | Performed by: CLINICAL MEDICAL LABORATORY

## 2022-08-04 PROCEDURE — 84153 ASSAY OF PSA TOTAL: CPT | Performed by: CLINICAL MEDICAL LABORATORY

## 2022-08-04 PROCEDURE — 36415 COLL VENOUS BLD VENIPUNCTURE: CPT | Performed by: CLINICAL MEDICAL LABORATORY

## 2022-08-04 PROCEDURE — 85025 COMPLETE CBC W/AUTO DIFF WBC: CPT | Performed by: CLINICAL MEDICAL LABORATORY

## 2022-08-04 PROCEDURE — 84403 ASSAY OF TOTAL TESTOSTERONE: CPT | Performed by: CLINICAL MEDICAL LABORATORY

## 2022-08-04 PROCEDURE — 80053 COMPREHEN METABOLIC PANEL: CPT | Performed by: CLINICAL MEDICAL LABORATORY

## 2022-08-04 PROCEDURE — 83036 HEMOGLOBIN GLYCOSYLATED A1C: CPT | Performed by: CLINICAL MEDICAL LABORATORY

## 2022-08-04 PROCEDURE — 80061 LIPID PANEL: CPT | Performed by: CLINICAL MEDICAL LABORATORY

## 2022-08-06 ENCOUNTER — LAB REQUISITION (OUTPATIENT)
Dept: LAB | Age: 52
End: 2022-08-06

## 2022-08-06 ENCOUNTER — LAB SERVICES (OUTPATIENT)
Dept: LAB | Age: 52
End: 2022-08-06

## 2022-08-06 DIAGNOSIS — R06.02 SHORTNESS OF BREATH: ICD-10-CM

## 2022-08-06 LAB — D DIMER PPP FEU-MCNC: 0.4 MG/L (FEU)

## 2022-08-06 PROCEDURE — 36415 COLL VENOUS BLD VENIPUNCTURE: CPT | Performed by: CLINICAL MEDICAL LABORATORY

## 2022-08-06 PROCEDURE — 85379 FIBRIN DEGRADATION QUANT: CPT | Performed by: CLINICAL MEDICAL LABORATORY

## 2022-09-16 DIAGNOSIS — R06.02 SOB (SHORTNESS OF BREATH): Primary | ICD-10-CM

## 2022-09-21 ENCOUNTER — HOSPITAL ENCOUNTER (OUTPATIENT)
Dept: GENERAL RADIOLOGY | Age: 52
Discharge: HOME | End: 2022-09-21
Attending: FAMILY MEDICINE

## 2022-09-21 ENCOUNTER — HOSPITAL ENCOUNTER (OUTPATIENT)
Dept: CARDIOLOGY | Age: 52
Discharge: HOME | End: 2022-09-21
Attending: FAMILY MEDICINE

## 2022-09-21 DIAGNOSIS — R06.02 SOB (SHORTNESS OF BREATH): ICD-10-CM

## 2022-09-21 DIAGNOSIS — R06.02 SHORTNESS OF BREATH: ICD-10-CM

## 2022-09-21 LAB — STRESS TARGET HR: 169 BPM

## 2022-09-21 PROCEDURE — 93017 CV STRESS TEST TRACING ONLY: CPT

## 2022-09-21 PROCEDURE — 71046 X-RAY EXAM CHEST 2 VIEWS: CPT

## 2022-09-21 PROCEDURE — 93018 CV STRESS TEST I&R ONLY: CPT | Performed by: INTERNAL MEDICINE

## 2022-09-21 PROCEDURE — 93016 CV STRESS TEST SUPVJ ONLY: CPT | Performed by: INTERNAL MEDICINE

## 2022-09-23 DIAGNOSIS — R06.02 SOB (SHORTNESS OF BREATH): Primary | ICD-10-CM

## 2023-03-14 DIAGNOSIS — M54.9 UPPER BACK PAIN ON RIGHT SIDE: ICD-10-CM

## 2023-03-14 DIAGNOSIS — M54.2 NECK PAIN: Primary | ICD-10-CM

## 2023-03-14 ASSESSMENT — ENCOUNTER SYMPTOMS: PAIN FREQUENCY: INTERMITTENT

## 2023-03-15 ENCOUNTER — HOSPITAL ENCOUNTER (OUTPATIENT)
Dept: PHYSICAL MEDICINE AND REHAB | Age: 53
Discharge: STILL ADMITTED OR ADMINISTRATIVE | End: 2023-03-15
Attending: FAMILY MEDICINE

## 2023-03-15 PROCEDURE — 97110 THERAPEUTIC EXERCISES: CPT | Performed by: PHYSICAL THERAPIST

## 2023-03-15 PROCEDURE — 97161 PT EVAL LOW COMPLEX 20 MIN: CPT | Performed by: PHYSICAL THERAPIST

## 2023-03-15 ASSESSMENT — ENCOUNTER SYMPTOMS
ALLEVIATING FACTORS: CHANGE IN POSITION
SUBJECTIVE PAIN PROGRESSION: IMPROVED
PAIN SEVERITY NOW: 1
PAIN SCALE AT HIGHEST: 8
ALLEVIATING FACTORS: MASSAGE
QUALITY: BURNING
ALLEVIATING FACTORS: REST
PAIN SCALE AT LOWEST: 1

## 2023-03-20 ENCOUNTER — HOSPITAL ENCOUNTER (OUTPATIENT)
Dept: PHYSICAL MEDICINE AND REHAB | Age: 53
Discharge: STILL ADMITTED OR ADMINISTRATIVE | End: 2023-03-20
Attending: FAMILY MEDICINE

## 2023-03-20 PROCEDURE — 97112 NEUROMUSCULAR REEDUCATION: CPT | Performed by: PHYSICAL THERAPIST

## 2023-03-20 PROCEDURE — 97110 THERAPEUTIC EXERCISES: CPT | Performed by: PHYSICAL THERAPIST

## 2023-03-20 PROCEDURE — 97140 MANUAL THERAPY 1/> REGIONS: CPT | Performed by: PHYSICAL THERAPIST

## 2023-03-20 ASSESSMENT — ENCOUNTER SYMPTOMS
PAIN SCALE AT LOWEST: 0
PAIN SEVERITY NOW: 1

## 2023-03-22 ENCOUNTER — HOSPITAL ENCOUNTER (OUTPATIENT)
Dept: PHYSICAL MEDICINE AND REHAB | Age: 53
Discharge: STILL ADMITTED OR ADMINISTRATIVE | End: 2023-03-22
Attending: FAMILY MEDICINE

## 2023-03-22 PROCEDURE — 97140 MANUAL THERAPY 1/> REGIONS: CPT | Performed by: PHYSICAL THERAPY ASSISTANT

## 2023-03-22 PROCEDURE — 97112 NEUROMUSCULAR REEDUCATION: CPT | Performed by: PHYSICAL THERAPY ASSISTANT

## 2023-03-22 PROCEDURE — 97110 THERAPEUTIC EXERCISES: CPT | Performed by: PHYSICAL THERAPY ASSISTANT

## 2023-03-22 ASSESSMENT — ENCOUNTER SYMPTOMS: PAIN SEVERITY NOW: 2

## 2023-03-27 ENCOUNTER — HOSPITAL ENCOUNTER (OUTPATIENT)
Dept: PHYSICAL MEDICINE AND REHAB | Age: 53
Discharge: STILL ADMITTED OR ADMINISTRATIVE | End: 2023-03-27
Attending: FAMILY MEDICINE

## 2023-03-27 PROCEDURE — 97110 THERAPEUTIC EXERCISES: CPT | Performed by: PHYSICAL THERAPY ASSISTANT

## 2023-03-27 PROCEDURE — 97140 MANUAL THERAPY 1/> REGIONS: CPT | Performed by: PHYSICAL THERAPY ASSISTANT

## 2023-03-27 ASSESSMENT — ENCOUNTER SYMPTOMS: PAIN SEVERITY NOW: 2

## 2023-03-29 ENCOUNTER — APPOINTMENT (OUTPATIENT)
Dept: PHYSICAL MEDICINE AND REHAB | Age: 53
End: 2023-03-29
Attending: FAMILY MEDICINE

## 2023-11-26 ENCOUNTER — V-VISIT (OUTPATIENT)
Dept: FAMILY MEDICINE | Age: 53
End: 2023-11-26

## 2023-11-26 DIAGNOSIS — J06.9 VIRAL UPPER RESPIRATORY TRACT INFECTION WITH COUGH: Primary | ICD-10-CM

## 2023-11-26 PROCEDURE — 99203 OFFICE O/P NEW LOW 30 MIN: CPT | Performed by: NURSE PRACTITIONER

## 2023-11-26 RX ORDER — ALBUTEROL SULFATE 90 UG/1
1-2 AEROSOL, METERED RESPIRATORY (INHALATION) EVERY 4 HOURS PRN
Qty: 1 EACH | Refills: 0 | Status: SHIPPED | OUTPATIENT
Start: 2023-11-26

## 2023-11-26 RX ORDER — BENZONATATE 200 MG/1
200 CAPSULE ORAL 3 TIMES DAILY PRN
Qty: 21 CAPSULE | Refills: 0 | Status: SHIPPED | OUTPATIENT
Start: 2023-11-26 | End: 2023-12-03

## 2023-11-26 RX ORDER — ASPIRIN 81 MG/1
TABLET, CHEWABLE ORAL DAILY
COMMUNITY

## 2024-01-03 ENCOUNTER — HOSPITAL ENCOUNTER (OUTPATIENT)
Dept: GENERAL RADIOLOGY | Age: 54
Discharge: HOME | End: 2024-01-03

## 2024-01-03 DIAGNOSIS — R05.9 COUGH: ICD-10-CM

## 2024-01-03 DIAGNOSIS — J06.9 VIRAL UPPER RESPIRATORY TRACT INFECTION WITH COUGH: ICD-10-CM

## 2024-01-03 PROCEDURE — 71046 X-RAY EXAM CHEST 2 VIEWS: CPT

## 2024-01-03 RX ORDER — ALBUTEROL SULFATE 90 UG/1
1-2 AEROSOL, METERED RESPIRATORY (INHALATION) EVERY 4 HOURS PRN
Qty: 8.5 G | Refills: 0 | Status: SHIPPED | OUTPATIENT
Start: 2024-01-03

## 2024-01-08 ENCOUNTER — APPOINTMENT (OUTPATIENT)
Dept: FAMILY MEDICINE | Age: 54
End: 2024-01-08

## 2024-04-24 ENCOUNTER — LAB REQUISITION (OUTPATIENT)
Dept: LAB | Age: 54
End: 2024-04-24

## 2024-04-24 DIAGNOSIS — R73.9 HYPERGLYCEMIA, UNSPECIFIED: ICD-10-CM

## 2024-04-24 DIAGNOSIS — Z12.5 ENCOUNTER FOR SCREENING FOR MALIGNANT NEOPLASM OF PROSTATE: ICD-10-CM

## 2024-04-24 DIAGNOSIS — Z00.00 ENCOUNTER FOR GENERAL ADULT MEDICAL EXAMINATION WITHOUT ABNORMAL FINDINGS: ICD-10-CM

## 2024-04-26 ENCOUNTER — LAB SERVICES (OUTPATIENT)
Dept: LAB | Age: 54
End: 2024-04-26

## 2024-04-26 PROCEDURE — 80050 GENERAL HEALTH PANEL: CPT | Performed by: CLINICAL MEDICAL LABORATORY

## 2024-04-26 PROCEDURE — 80061 LIPID PANEL: CPT | Performed by: CLINICAL MEDICAL LABORATORY

## 2024-04-26 PROCEDURE — 84153 ASSAY OF PSA TOTAL: CPT | Performed by: CLINICAL MEDICAL LABORATORY

## 2024-04-26 PROCEDURE — 83036 HEMOGLOBIN GLYCOSYLATED A1C: CPT | Performed by: CLINICAL MEDICAL LABORATORY

## 2024-04-27 LAB
ALBUMIN SERPL-MCNC: 4.3 G/DL (ref 3.6–5.1)
ALBUMIN/GLOB SERPL: 1.4 {RATIO} (ref 1–2.4)
ALP SERPL-CCNC: 71 UNITS/L (ref 45–117)
ALT SERPL-CCNC: 25 UNITS/L
ANION GAP SERPL CALC-SCNC: 11 MMOL/L (ref 7–19)
AST SERPL-CCNC: 15 UNITS/L
BASOPHILS # BLD: 0.1 K/MCL (ref 0–0.3)
BASOPHILS NFR BLD: 1 %
BILIRUB SERPL-MCNC: 0.9 MG/DL (ref 0.2–1)
BUN SERPL-MCNC: 19 MG/DL (ref 6–20)
BUN/CREAT SERPL: 14 (ref 7–25)
CALCIUM SERPL-MCNC: 9.6 MG/DL (ref 8.4–10.2)
CHLORIDE SERPL-SCNC: 106 MMOL/L (ref 97–110)
CHOLEST SERPL-MCNC: 196 MG/DL
CHOLEST/HDLC SERPL: 3.5 {RATIO}
CO2 SERPL-SCNC: 26 MMOL/L (ref 21–32)
CREAT SERPL-MCNC: 1.4 MG/DL (ref 0.67–1.17)
DEPRECATED RDW RBC: 46.9 FL (ref 39–50)
EGFRCR SERPLBLD CKD-EPI 2021: 60 ML/MIN/{1.73_M2}
EOSINOPHIL # BLD: 0.2 K/MCL (ref 0–0.5)
EOSINOPHIL NFR BLD: 2 %
ERYTHROCYTE [DISTWIDTH] IN BLOOD: 14.1 % (ref 11–15)
FASTING DURATION TIME PATIENT: 12 HOURS (ref 0–999)
GLOBULIN SER-MCNC: 3 G/DL (ref 2–4)
GLUCOSE SERPL-MCNC: 98 MG/DL (ref 70–99)
HBA1C MFR BLD: 5.2 % (ref 4.5–5.6)
HCT VFR BLD CALC: 53.9 % (ref 39–51)
HDLC SERPL-MCNC: 56 MG/DL
HGB BLD-MCNC: 17.7 G/DL (ref 13–17)
IMM GRANULOCYTES # BLD AUTO: 0 K/MCL (ref 0–0.2)
IMM GRANULOCYTES # BLD: 0 %
LDLC SERPL CALC-MCNC: 124 MG/DL
LYMPHOCYTES # BLD: 2.5 K/MCL (ref 1–4)
LYMPHOCYTES NFR BLD: 22 %
MCH RBC QN AUTO: 29.9 PG (ref 26–34)
MCHC RBC AUTO-ENTMCNC: 32.8 G/DL (ref 32–36.5)
MCV RBC AUTO: 91.2 FL (ref 78–100)
MONOCYTES # BLD: 1.1 K/MCL (ref 0.3–0.9)
MONOCYTES NFR BLD: 10 %
NEUTROPHILS # BLD: 7.3 K/MCL (ref 1.8–7.7)
NEUTROPHILS NFR BLD: 65 %
NONHDLC SERPL-MCNC: 140 MG/DL
NRBC BLD MANUAL-RTO: 0 /100 WBC
PLATELET # BLD AUTO: 300 K/MCL (ref 140–450)
POTASSIUM SERPL-SCNC: 4.7 MMOL/L (ref 3.4–5.1)
PROT SERPL-MCNC: 7.3 G/DL (ref 6.4–8.2)
PSA SERPL-MCNC: 4.75 NG/ML
RBC # BLD: 5.91 MIL/MCL (ref 4.5–5.9)
SODIUM SERPL-SCNC: 138 MMOL/L (ref 135–145)
TRIGL SERPL-MCNC: 78 MG/DL
TSH SERPL-ACNC: 2.4 MCUNITS/ML (ref 0.35–5)
WBC # BLD: 11.2 K/MCL (ref 4.2–11)

## 2024-10-17 ENCOUNTER — LAB REQUISITION (OUTPATIENT)
Dept: LAB | Age: 54
End: 2024-10-17

## 2024-10-17 DIAGNOSIS — R97.20 ELEVATED PROSTATE SPECIFIC ANTIGEN (PSA): ICD-10-CM

## 2024-10-17 DIAGNOSIS — D75.1 SECONDARY POLYCYTHEMIA: ICD-10-CM

## 2024-10-17 SDOH — ECONOMIC STABILITY: HOUSING INSECURITY: DO YOU HAVE PROBLEMS WITH ANY OF THE FOLLOWING?: NONE OF THE ABOVE;PATIENT DECLINED

## 2024-10-17 SDOH — ECONOMIC STABILITY: HOUSING INSECURITY: WHAT IS YOUR LIVING SITUATION TODAY?: PATIENT DECLINED

## 2024-10-17 SDOH — ECONOMIC STABILITY: FOOD INSECURITY: WITHIN THE PAST 12 MONTHS, THE FOOD YOU BOUGHT JUST DIDN'T LAST AND YOU DIDN'T HAVE MONEY TO GET MORE.: PATIENT DECLINED

## 2024-10-17 SDOH — ECONOMIC STABILITY: GENERAL: WOULD YOU LIKE HELP WITH ANY OF THE FOLLOWING NEEDS?: I DON'T WANT HELP WITH ANY OF THESE

## 2024-10-17 SDOH — ECONOMIC STABILITY: TRANSPORTATION INSECURITY
IN THE PAST 12 MONTHS, HAS LACK OF RELIABLE TRANSPORTATION KEPT YOU FROM MEDICAL APPOINTMENTS, MEETINGS, WORK OR FROM GETTING THINGS NEEDED FOR DAILY LIVING?: PATIENT DECLINED

## 2024-10-17 ASSESSMENT — SOCIAL DETERMINANTS OF HEALTH (SDOH)
IN THE PAST 12 MONTHS, HAS THE ELECTRIC, GAS, OIL, OR WATER COMPANY THREATENED TO SHUT OFF SERVICE IN YOUR HOME?: PATIENT DECLINED

## 2024-10-19 ENCOUNTER — LAB SERVICES (OUTPATIENT)
Dept: LAB | Age: 54
End: 2024-10-19

## 2024-10-19 LAB
BASOPHILS # BLD: 0.1 K/MCL (ref 0–0.3)
BASOPHILS NFR BLD: 1 %
DEPRECATED RDW RBC: 44.9 FL (ref 39–50)
EOSINOPHIL # BLD: 0.2 K/MCL (ref 0–0.5)
EOSINOPHIL NFR BLD: 3 %
ERYTHROCYTE [DISTWIDTH] IN BLOOD: 13.4 % (ref 11–15)
HCT VFR BLD CALC: 49.3 % (ref 39–51)
HGB BLD-MCNC: 16.4 G/DL (ref 13–17)
IMM GRANULOCYTES # BLD AUTO: 0 K/MCL (ref 0–0.2)
IMM GRANULOCYTES # BLD: 0 %
LYMPHOCYTES # BLD: 2.4 K/MCL (ref 1–4)
LYMPHOCYTES NFR BLD: 36 %
MCH RBC QN AUTO: 30.1 PG (ref 26–34)
MCHC RBC AUTO-ENTMCNC: 33.3 G/DL (ref 32–36.5)
MCV RBC AUTO: 90.5 FL (ref 78–100)
MONOCYTES # BLD: 0.7 K/MCL (ref 0.3–0.9)
MONOCYTES NFR BLD: 11 %
NEUTROPHILS # BLD: 3.2 K/MCL (ref 1.8–7.7)
NEUTROPHILS NFR BLD: 49 %
NRBC BLD MANUAL-RTO: 0 /100 WBC
PLATELET # BLD AUTO: 314 K/MCL (ref 140–450)
PSA SERPL-MCNC: 5.76 NG/ML
RBC # BLD: 5.45 MIL/MCL (ref 4.5–5.9)
WBC # BLD: 6.6 K/MCL (ref 4.2–11)

## 2024-10-19 PROCEDURE — 84153 ASSAY OF PSA TOTAL: CPT | Performed by: CLINICAL MEDICAL LABORATORY

## 2024-10-19 PROCEDURE — 85025 COMPLETE CBC W/AUTO DIFF WBC: CPT | Performed by: CLINICAL MEDICAL LABORATORY

## 2024-10-24 ENCOUNTER — APPOINTMENT (OUTPATIENT)
Dept: INTERNAL MEDICINE | Age: 54
End: 2024-10-24

## 2024-10-24 VITALS
HEART RATE: 62 BPM | DIASTOLIC BLOOD PRESSURE: 86 MMHG | BODY MASS INDEX: 34.06 KG/M2 | HEIGHT: 71 IN | SYSTOLIC BLOOD PRESSURE: 127 MMHG | RESPIRATION RATE: 16 BRPM | WEIGHT: 243.3 LBS

## 2024-10-24 DIAGNOSIS — E29.1 MALE HYPOGONADISM: ICD-10-CM

## 2024-10-24 DIAGNOSIS — I10 PRIMARY HYPERTENSION: Primary | ICD-10-CM

## 2024-10-24 DIAGNOSIS — Z23 NEED FOR VACCINATION: ICD-10-CM

## 2024-10-24 RX ORDER — LISINOPRIL 30 MG/1
30 TABLET ORAL DAILY
COMMUNITY
Start: 2024-05-14

## 2024-10-24 RX ORDER — TESTOSTERONE CYPIONATE 200 MG/ML
0.5 VIAL (ML) INTRAMUSCULAR
COMMUNITY

## 2024-10-24 ASSESSMENT — PATIENT HEALTH QUESTIONNAIRE - PHQ9
2. FEELING DOWN, DEPRESSED OR HOPELESS: NOT AT ALL
CLINICAL INTERPRETATION OF PHQ2 SCORE: NO FURTHER SCREENING NEEDED
CLINICAL INTERPRETATION OF PHQ2 SCORE: NO FURTHER SCREENING NEEDED
SUM OF ALL RESPONSES TO PHQ9 QUESTIONS 1 AND 2: 0
2. FEELING DOWN, DEPRESSED OR HOPELESS: NOT AT ALL
SUM OF ALL RESPONSES TO PHQ9 QUESTIONS 1 AND 2: 0
1. LITTLE INTEREST OR PLEASURE IN DOING THINGS: NOT AT ALL
1. LITTLE INTEREST OR PLEASURE IN DOING THINGS: NOT AT ALL

## 2024-10-24 ASSESSMENT — ENCOUNTER SYMPTOMS
DIZZINESS: 0
RESPIRATORY NEGATIVE: 1
HEADACHES: 0
PSYCHIATRIC NEGATIVE: 1
GASTROINTESTINAL NEGATIVE: 1

## 2024-11-12 ENCOUNTER — LAB SERVICES (OUTPATIENT)
Dept: LAB | Age: 54
End: 2024-11-12

## 2024-11-12 DIAGNOSIS — E29.1 MALE HYPOGONADISM: ICD-10-CM

## 2024-11-12 LAB
ALBUMIN SERPL-MCNC: 4 G/DL (ref 3.4–5)
ALBUMIN/GLOB SERPL: 1.3 {RATIO} (ref 1–2.4)
ALP SERPL-CCNC: 50 UNITS/L (ref 45–117)
ALT SERPL-CCNC: 38 UNITS/L
ANION GAP SERPL CALC-SCNC: 10 MMOL/L (ref 7–19)
AST SERPL-CCNC: 63 UNITS/L
BILIRUB SERPL-MCNC: 0.8 MG/DL (ref 0.2–1)
BUN SERPL-MCNC: 14 MG/DL (ref 6–20)
BUN/CREAT SERPL: 11 (ref 7–25)
CALCIUM SERPL-MCNC: 9.6 MG/DL (ref 8.4–10.2)
CHLORIDE SERPL-SCNC: 111 MMOL/L (ref 97–110)
CHOLEST SERPL-MCNC: 169 MG/DL
CHOLEST/HDLC SERPL: 3.8 {RATIO}
CO2 SERPL-SCNC: 26 MMOL/L (ref 21–32)
CREAT SERPL-MCNC: 1.25 MG/DL (ref 0.67–1.17)
EGFRCR SERPLBLD CKD-EPI 2021: 69 ML/MIN/{1.73_M2}
FASTING DURATION TIME PATIENT: ABNORMAL H
GLOBULIN SER-MCNC: 3.1 G/DL (ref 2–4)
GLUCOSE SERPL-MCNC: 97 MG/DL (ref 70–99)
HDLC SERPL-MCNC: 44 MG/DL
LDLC SERPL CALC-MCNC: 110 MG/DL
NONHDLC SERPL-MCNC: 125 MG/DL
POTASSIUM SERPL-SCNC: 4.9 MMOL/L (ref 3.4–5.1)
PROT SERPL-MCNC: 7.1 G/DL (ref 6.4–8.2)
SODIUM SERPL-SCNC: 142 MMOL/L (ref 135–145)
TESTOST SERPL-MCNC: 1173.2 NG/DL (ref 280–1100)
TRIGL SERPL-MCNC: 75 MG/DL

## 2024-11-12 PROCEDURE — 84403 ASSAY OF TOTAL TESTOSTERONE: CPT | Performed by: CLINICAL MEDICAL LABORATORY

## 2024-11-12 PROCEDURE — 80053 COMPREHEN METABOLIC PANEL: CPT | Performed by: CLINICAL MEDICAL LABORATORY

## 2024-11-12 PROCEDURE — 36415 COLL VENOUS BLD VENIPUNCTURE: CPT | Performed by: INTERNAL MEDICINE

## 2024-11-12 PROCEDURE — 80061 LIPID PANEL: CPT | Performed by: CLINICAL MEDICAL LABORATORY

## 2024-11-15 ENCOUNTER — TELEPHONE (OUTPATIENT)
Dept: INTERNAL MEDICINE | Age: 54
End: 2024-11-15

## 2024-11-15 ENCOUNTER — E-ADVICE (OUTPATIENT)
Dept: INTERNAL MEDICINE | Age: 54
End: 2024-11-15

## 2024-11-15 DIAGNOSIS — I10 PRIMARY HYPERTENSION: Primary | ICD-10-CM

## 2024-11-19 RX ORDER — LISINOPRIL 30 MG/1
30 TABLET ORAL DAILY
Qty: 90 TABLET | Refills: 3 | Status: SHIPPED | OUTPATIENT
Start: 2024-11-19

## 2025-02-12 ENCOUNTER — APPOINTMENT (OUTPATIENT)
Dept: INTERNAL MEDICINE | Age: 55
End: 2025-02-12

## 2025-02-12 DIAGNOSIS — I10 PRIMARY HYPERTENSION: ICD-10-CM

## 2025-02-12 RX ORDER — LISINOPRIL 30 MG/1
30 TABLET ORAL DAILY
Qty: 90 TABLET | Refills: 3 | Status: SHIPPED | OUTPATIENT
Start: 2025-02-12

## 2025-02-20 DIAGNOSIS — I10 PRIMARY HYPERTENSION: ICD-10-CM

## 2025-02-24 RX ORDER — LISINOPRIL 30 MG/1
30 TABLET ORAL DAILY
Qty: 90 TABLET | Refills: 3 | Status: SHIPPED | OUTPATIENT
Start: 2025-02-24

## 2025-03-06 ENCOUNTER — E-ADVICE (OUTPATIENT)
Dept: INTERNAL MEDICINE | Age: 55
End: 2025-03-06

## 2025-03-06 DIAGNOSIS — M25.569 KNEE PAIN, UNSPECIFIED CHRONICITY, UNSPECIFIED LATERALITY: Primary | ICD-10-CM

## 2025-03-17 ENCOUNTER — IMAGING SERVICES (OUTPATIENT)
Dept: GENERAL RADIOLOGY | Age: 55
End: 2025-03-17
Attending: FAMILY MEDICINE

## 2025-03-17 ENCOUNTER — APPOINTMENT (OUTPATIENT)
Dept: SPORTS MEDICINE | Age: 55
End: 2025-03-17
Attending: INTERNAL MEDICINE

## 2025-03-17 VITALS — HEIGHT: 71 IN | BODY MASS INDEX: 34.44 KG/M2 | WEIGHT: 246 LBS

## 2025-03-17 DIAGNOSIS — M65.262 CALCIFIC TENDINITIS OF LEFT KNEE: ICD-10-CM

## 2025-03-17 DIAGNOSIS — M25.562 LEFT KNEE PAIN, UNSPECIFIED CHRONICITY: ICD-10-CM

## 2025-03-17 DIAGNOSIS — S83.412A SPRAIN OF MEDIAL COLLATERAL LIGAMENT OF LEFT KNEE, INITIAL ENCOUNTER: Primary | ICD-10-CM

## 2025-03-17 PROCEDURE — 73564 X-RAY EXAM KNEE 4 OR MORE: CPT | Performed by: FAMILY MEDICINE

## 2025-03-17 PROCEDURE — 99204 OFFICE O/P NEW MOD 45 MIN: CPT | Performed by: FAMILY MEDICINE

## 2025-03-17 ASSESSMENT — ENCOUNTER SYMPTOMS
NERVOUS/ANXIOUS: 0
NUMBNESS: 0
SHORTNESS OF BREATH: 0
CHEST TIGHTNESS: 0
FATIGUE: 0
FEVER: 0
TROUBLE SWALLOWING: 0
DIZZINESS: 0
DIARRHEA: 0
SORE THROAT: 0
SLEEP DISTURBANCE: 0
WHEEZING: 0
BACK PAIN: 0
VOMITING: 0
ABDOMINAL PAIN: 0
CHILLS: 0
HEADACHES: 0
LIGHT-HEADEDNESS: 0
WOUND: 0
EYE PAIN: 0
WEAKNESS: 0
CONSTIPATION: 0
NAUSEA: 0
AGITATION: 0

## 2025-03-25 ASSESSMENT — ENCOUNTER SYMPTOMS
QUALITY: STIFF
QUALITY: SORE
QUALITY: SHARP
QUALITY: POPPING / CLICKING

## 2025-03-26 ENCOUNTER — HOSPITAL ENCOUNTER (OUTPATIENT)
Dept: PHYSICAL MEDICINE AND REHAB | Age: 55
Discharge: STILL ADMITTED OR ADMINISTRATIVE | End: 2025-03-27
Attending: FAMILY MEDICINE

## 2025-03-26 PROCEDURE — 97110 THERAPEUTIC EXERCISES: CPT

## 2025-03-26 PROCEDURE — 97161 PT EVAL LOW COMPLEX 20 MIN: CPT

## 2025-03-26 ASSESSMENT — MOVEMENT AND STRENGTH ASSESSMENTS
MAKING SHARP TURNS WHILE RUNNING FAST: MODERATE DIFFICULTY
ROLLING OVER IN BED: NO DIFFICULTY
YOUR USUAL HOBBIES, RECREATIONAL OR SPORTING ACTIVIITIES: QUITE A BIT OF DIFFICULTY
STANDING FOR 1 HOUR: NO DIFFICULTY
HOPPING: NO DIFFICULTY
GOING UP OR DOWN 10 STAIRS (ABOUT 1 FLIGHT OF STAIRS): A LITTLE BIT OF DIFFICULTY
TOTAL SCORE: 83.75
ANY OF YOUR USUAL WORK, HOUSEWORK OR SCHOOL ACTIVITIES: NO DIFFICULTY
WALKING BETWEEN ROOMS: NO DIFFICULTY
PERFORMING HEAVY ACTIVITIES AROUND YOUR HOME: A LITTLE BIT OF DIFFICULTY
WALKING A MILE: A LITTLE BIT OF DIFFICULTY
RUNNING ON EVEN GROUND: MODERATE DIFFICULTY
GETTING INTO OR OUT OF A CAR: NO DIFFICULTY
SITTING FOR 1 HOUR: NO DIFFICULTY
PERFORMING LIGHT ACTIVITES AROUND YOUR HOME: NO DIFFICULTY
WALKING 2 BLOCKS: NO DIFFICULTY
PUTTING ON YOUR SHOES OR SOCKS: NO DIFFICULTY
RUNNING ON UNEVEN GROUND: MODERATE DIFFICULTY
SQUATTING: A LITTLE BIT OF DIFFICULTY
GETTING INTO OR OUT OF THE BATH: NO DIFFICULTY
LIFTING AN OBJECT, LIKE A BAG OF GROCERIES, FROM THE FLOOR: NO DIFFICULTY

## 2025-03-26 ASSESSMENT — ENCOUNTER SYMPTOMS
PAIN FREQUENCY: INTERMITTENT
ALLEVIATING FACTORS: ICE
SUBJECTIVE PAIN PROGRESSION: NO CHANGE
ALLEVIATING FACTORS: REST
PAIN SEVERITY NOW: 1
PAIN SCALE AT HIGHEST: 4
PAIN LOCATION: LEFT KNEE
ALLEVIATING FACTORS: MASSAGE

## 2025-04-08 ENCOUNTER — HOSPITAL ENCOUNTER (OUTPATIENT)
Dept: PHYSICAL MEDICINE AND REHAB | Age: 55
Discharge: STILL ADMITTED OR ADMINISTRATIVE | End: 2025-04-09
Attending: FAMILY MEDICINE

## 2025-04-08 PROCEDURE — 97110 THERAPEUTIC EXERCISES: CPT

## 2025-04-08 ASSESSMENT — ENCOUNTER SYMPTOMS: PAIN SEVERITY NOW: 2

## 2025-04-15 ENCOUNTER — HOSPITAL ENCOUNTER (OUTPATIENT)
Dept: PHYSICAL MEDICINE AND REHAB | Age: 55
Discharge: STILL ADMITTED OR ADMINISTRATIVE | End: 2025-04-16
Attending: FAMILY MEDICINE

## 2025-04-15 PROCEDURE — 97110 THERAPEUTIC EXERCISES: CPT

## 2025-04-15 ASSESSMENT — ENCOUNTER SYMPTOMS: PAIN SEVERITY NOW: 0

## 2025-04-22 ENCOUNTER — HOSPITAL ENCOUNTER (OUTPATIENT)
Dept: PHYSICAL MEDICINE AND REHAB | Age: 55
Discharge: STILL ADMITTED OR ADMINISTRATIVE | End: 2025-04-22
Attending: FAMILY MEDICINE

## 2025-04-29 ENCOUNTER — APPOINTMENT (OUTPATIENT)
Dept: PHYSICAL MEDICINE AND REHAB | Age: 55
End: 2025-04-29
Attending: FAMILY MEDICINE

## 2025-05-06 ENCOUNTER — APPOINTMENT (OUTPATIENT)
Dept: PHYSICAL MEDICINE AND REHAB | Age: 55
End: 2025-05-06
Attending: FAMILY MEDICINE

## 2025-05-08 ENCOUNTER — APPOINTMENT (OUTPATIENT)
Dept: PHYSICAL MEDICINE AND REHAB | Age: 55
End: 2025-05-08
Attending: FAMILY MEDICINE

## 2025-08-20 SDOH — ECONOMIC STABILITY: FOOD INSECURITY: WITHIN THE PAST 12 MONTHS, THE FOOD YOU BOUGHT JUST DIDN'T LAST AND YOU DIDN'T HAVE MONEY TO GET MORE.: PATIENT DECLINED

## 2025-08-20 SDOH — ECONOMIC STABILITY: HOUSING INSECURITY: DO YOU HAVE PROBLEMS WITH ANY OF THE FOLLOWING?: NONE OF THE ABOVE;PATIENT DECLINED

## 2025-08-20 SDOH — ECONOMIC STABILITY: HOUSING INSECURITY: WHAT IS YOUR LIVING SITUATION TODAY?: I HAVE A STEADY PLACE TO LIVE

## 2025-08-27 ENCOUNTER — APPOINTMENT (OUTPATIENT)
Dept: INTERNAL MEDICINE | Age: 55
End: 2025-08-27

## 2025-09-04 ENCOUNTER — APPOINTMENT (OUTPATIENT)
Dept: INTERNAL MEDICINE | Age: 55
End: 2025-09-04

## 2025-09-04 ASSESSMENT — PATIENT HEALTH QUESTIONNAIRE - PHQ9
1. LITTLE INTEREST OR PLEASURE IN DOING THINGS: NOT AT ALL
2. FEELING DOWN, DEPRESSED OR HOPELESS: NOT AT ALL
SUM OF ALL RESPONSES TO PHQ9 QUESTIONS 1 AND 2: 0
SUM OF ALL RESPONSES TO PHQ9 QUESTIONS 1 AND 2: 0

## 2025-09-04 ASSESSMENT — ENCOUNTER SYMPTOMS
NEUROLOGICAL NEGATIVE: 1
RESPIRATORY NEGATIVE: 1
GASTROINTESTINAL NEGATIVE: 1
PSYCHIATRIC NEGATIVE: 1

## 2025-09-05 ENCOUNTER — TELEPHONE (OUTPATIENT)
Dept: BARIATRICS/WEIGHT MGMT | Age: 55
End: 2025-09-05

## 2026-03-04 ENCOUNTER — APPOINTMENT (OUTPATIENT)
Dept: INTERNAL MEDICINE | Age: 56
End: 2026-03-04